# Patient Record
Sex: FEMALE | Race: BLACK OR AFRICAN AMERICAN | NOT HISPANIC OR LATINO | Employment: OTHER | URBAN - METROPOLITAN AREA
[De-identification: names, ages, dates, MRNs, and addresses within clinical notes are randomized per-mention and may not be internally consistent; named-entity substitution may affect disease eponyms.]

---

## 2022-09-09 ENCOUNTER — NURSE TRIAGE (OUTPATIENT)
Dept: NURSING | Facility: CLINIC | Age: 62
End: 2022-09-09

## 2022-09-09 NOTE — TELEPHONE ENCOUNTER
Bad tooth seen in ER and the started antibiotics and then got C-diff started another antibiotic.   Pain in the tooth. Called dentist and told to be seen  Back top left tooth.   Offered [facility]/Urgent Care hours, she declined as she wanted to know if [facility]. Advised to call her insurance. She declined and stated [facility]  Anastacia Navarro RN on 9/9/2022 at 11:41 AM      Reason for Disposition    SEVERE toothache pain    Additional Information    Negative: Pale cold skin and very weak (can't stand)    Negative: Similar pain previously and it was from 'heart attack'    Negative: Similar pain previously and it was from 'angina' and not relieved by nitroglycerin    Negative: Sounds like a life-threatening emergency to the triager    Negative: Chest pain    Negative: Toothache followed tooth injury    Negative: Patient sounds very sick or weak to the triager    Negative: Face is swollen    Negative: Fever    Protocols used: TOOTHACHE-A-OH

## 2023-03-14 ENCOUNTER — TRANSFERRED RECORDS (OUTPATIENT)
Dept: HEALTH INFORMATION MANAGEMENT | Facility: CLINIC | Age: 63
End: 2023-03-14

## 2023-03-22 ENCOUNTER — TRANSFERRED RECORDS (OUTPATIENT)
Dept: HEALTH INFORMATION MANAGEMENT | Facility: CLINIC | Age: 63
End: 2023-03-22

## 2023-03-30 ENCOUNTER — MEDICAL CORRESPONDENCE (OUTPATIENT)
Dept: HEALTH INFORMATION MANAGEMENT | Facility: CLINIC | Age: 63
End: 2023-03-30

## 2023-04-19 ENCOUNTER — TRANSCRIBE ORDERS (OUTPATIENT)
Dept: OTHER | Age: 63
End: 2023-04-19

## 2023-04-19 DIAGNOSIS — M79.669 PAIN IN UNSPECIFIED LOWER LEG: Primary | ICD-10-CM

## 2023-04-26 NOTE — TELEPHONE ENCOUNTER
DIAGNOSIS: B/L lower leg pain   APPOINTMENT DATE: 5.3.23   NOTES STATUS DETAILS   OFFICE NOTE from referring provider Internal 4.19.23 Pratik Ojeda   OFFICE NOTE from other specialist Internal [facility]:  Transferred record-3.22.23     MRI PACS [facility]:  L tib/fib; 3.14.23  R tib/fib; 3.14.23     XRAYS (IMAGES & REPORTS) PACS [facility]:  R tib/fib; 11.1.22  L tib/fib; 11.1.22     Action April 26, 2023 4:07 PM    Action Taken [place] [facility]:  Phone  [phone]    IMAGES IN PACS ALREADY

## 2023-05-03 ENCOUNTER — PRE VISIT (OUTPATIENT)
Dept: ORTHOPEDICS | Facility: CLINIC | Age: 63
End: 2023-05-03

## 2023-05-03 ENCOUNTER — OFFICE VISIT (OUTPATIENT)
Dept: ORTHOPEDICS | Facility: CLINIC | Age: 63
End: 2023-05-03
Payer: COMMERCIAL

## 2023-05-03 ENCOUNTER — ANCILLARY PROCEDURE (OUTPATIENT)
Dept: GENERAL RADIOLOGY | Facility: CLINIC | Age: 63
End: 2023-05-03
Attending: FAMILY MEDICINE
Payer: COMMERCIAL

## 2023-05-03 DIAGNOSIS — M79.669 PAIN IN UNSPECIFIED LOWER LEG: ICD-10-CM

## 2023-05-03 DIAGNOSIS — I73.9 CLAUDICATION OF BOTH LOWER EXTREMITIES (H): Primary | ICD-10-CM

## 2023-05-03 PROCEDURE — 72100 X-RAY EXAM L-S SPINE 2/3 VWS: CPT | Performed by: SURGERY

## 2023-05-03 PROCEDURE — 99203 OFFICE O/P NEW LOW 30 MIN: CPT | Performed by: FAMILY MEDICINE

## 2023-05-03 RX ORDER — SPIRONOLACTONE 25 MG/1
2 TABLET ORAL DAILY
COMMUNITY
Start: 2023-01-31

## 2023-05-03 RX ORDER — ATORVASTATIN CALCIUM 40 MG/1
1 TABLET, FILM COATED ORAL AT BEDTIME
COMMUNITY
Start: 2022-11-01

## 2023-05-03 RX ORDER — GABAPENTIN 300 MG/1
300 CAPSULE ORAL 3 TIMES DAILY
COMMUNITY

## 2023-05-03 RX ORDER — BUPROPION HYDROCHLORIDE 150 MG/1
150 TABLET ORAL
COMMUNITY
Start: 2022-12-11

## 2023-05-03 RX ORDER — TOPIRAMATE 50 MG/1
TABLET, FILM COATED ORAL
COMMUNITY
Start: 2023-02-08

## 2023-05-03 NOTE — PROGRESS NOTES
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  Her bilateral LE pain only occurs after exercise or running.  It does not occur at rest.  Describes tightness and pain in the shin area of both legs. She does lose the feeling in her feet and causes her to fall. Typically, takes 20-30 minutes for her to legs to return to normal after exercises. She enjoys using the treadmill for exercise. She is only able to walk 2.0 mph on the treadmill.     Patient saw a vascular specialist at the [facility] who reviewed her lower extremity circulation studies.  They did not think that her mild evidence of peripheral vascular disease would be consistent with her symptoms.  [facility] transferred records reviewed by me.    Patient was subsequently referred to the [facility] by [facility] specifically [facility] extremity compartment testing.  Their concern is that she may have chronic exertional compartment syndrome, and they did not have testing for it available at the [facility].    The patient was not certain that she wanted to proceed with the option of a fasciotomy surgically, if it was offered to her because of elevated compartment pressures, so she did physical therapy instead, exercises that were focused on claudication symptoms.  Physical therapy did not help,    Patient denies a history of low back discomfort recently.  She remembers having some episodes of low back discomfort 10 years ago.  She denies numbness and tingling in the bilateral lower extremities with long periods of sitting or standing..  She denies having radiating pain into the lower extremities, apart from the time that she tries to walk on a treadmill.    Patient has had a total hysterectomy in the past,  colonoscopy 2010.    Patient indicates that she has been , which used to involve running.  She was a  for over 2 decades, she retired in 2012.  She indicated that her shins and her feet would always ache with any attempted running, but she did not tell her work about it because she did not want it to affect her employment.        Imaging study results below reviewed by me:  [facility] 3/14/2023: MRI right tibia and fibula without contrast.  No suspicious osseous lesions noted.  No muscular edema or atrophy.  No abnormal fluid collection or fascial defect.  Tendons appear normal.    [facility] 3/14/2023 MRI left tibia and fibula without contrast.  No suspicious osseous lesions, no muscular edema or atrophy.  No fascial defect.  Tendons appear normal.    [facility] 1/9/2023 bilateral lower extremity arterial ultrasound; using B-mode, color flow and spectral Doppler assessment: Right leg arteries patent, no areas of elevated velocity or abnormal waveform to suggest stenosis; left leg arteries patent, no areas of elevated velocity or abnormal waveform to suggest stenosis.    [facility]: Exercise ABIs, consistent with mild peripheral vascular disease.  Right side: Preexercise RAKEL 1.2, decreasing to 0.91 postexercise  Left side: Preexercise RAKEL 1.18, decreasing to 0.66 post exercise      PMH:  Hypertension, diverticular disease, pancreatic insufficiency (on Creon) anxiety, depression, cerebrovascular accident, GERD, colon polyp (2010) total hysterectomy, obstructive sleep apnea, obesity, history of tobacco use.    Active problem list:  There is no problem list on file for this patient.      FH:  No family history on file.    SH:  Social History     Socioeconomic History     Marital status:      Spouse name: Not on file     Number of children: Not on file     Years of education: Not on file     Highest education level: Not on file   Occupational History      Not on file   Tobacco Use     Smoking status: Not on file     Smokeless tobacco: Not on file   Substance and Sexual Activity     Alcohol use: Not on file     Drug use: Not on file     Sexual activity: Not on file   Other Topics Concern     Not on file   Social History Narrative     Not on file     Social Determinants of Health     Financial Resource Strain: Not on file   Food Insecurity: Not on file   Transportation Needs: Not on file   Physical Activity: Not on file   Stress: Not on file   Social Connections: Not on file   Intimate Partner Violence: Not on file   Housing Stability: Not on file       MEDS:  See EMR, reviewed  ALL:  See EMR, reviewed    REVIEW OF SYSTEMS:  CONSTITUTIONAL:NEGATIVE for fever, chills, change in weight  INTEGUMENTARY/SKIN: NEGATIVE for worrisome rashes, moles or lesions  EYES: NEGATIVE for vision changes or irritation  ENT/MOUTH: NEGATIVE for ear, mouth and throat problems  RESP:NEGATIVE for significant cough or SOB  BREAST: NEGATIVE for masses, tenderness or discharge  CV: NEGATIVE for chest pain, palpitations or peripheral edema  GI: NEGATIVE for nausea, abdominal pain, heartburn, or change in bowel habits  :NEGATIVE for frequency, dysuria, or hematuria  :NEGATIVE for frequency, dysuria, or hematuria  NEURO: NEGATIVE for weakness, dizziness or paresthesias  ENDOCRINE: NEGATIVE for temperature intolerance, skin/hair changes  HEME/ALLERGY/IMMUNE: NEGATIVE for bleeding problems  PSYCHIATRIC: NEGATIVE for changes in mood or affect        Forward flexion of lumbar spine to touch shins.  Extension full, without limitation.      Straight leg raise in seated position with chin-to-chest negative bilaterally.    Lower extremity strength is 5/5 symmetrically bilaterally to flexion and extension at hips, knees, ankles, including toe strength and foot evertor strength.    VELVET test negative.  Normal ROM at hips bilaterally.  Nontender over bilateral SI joints.      Inguinal pulses and  posterior tibial pulses strong and symmetrical bilaterally.    1+ reflexes bilateral knees, 2+ reflexes bilateral ankles.  Downgoing toes.  No clonus.    Sensation to light touch intact bilateral lower extremities.    Skin overlying low back wnl.  Appropriate in conversation and affect        I personally reviewed with the patient x-rays of the lumbar spine that show overall good maintenance of the joint spaces with no evidence of significant degenerative disc disease.  Mild degenerative joint disease noted.      Assessment bilateral lower extremity claudication symptoms with walking and running    Plan: MRA with maneuvers pending of the bilateral lower extremities to rule out popliteal artery entrapment.  Follow-up face-to-face visit after the MRA studies.  We agreed that if it did not yield positive results that it would be reasonable, based on her clinical history, to proceed with bilateral lower extremity compartment testing to rule out chronic compartment syndrome.

## 2023-05-03 NOTE — LETTER
5/3/2023      RE: Sherrie Jackson  [address] [id number]  [place] [address]     Dear Colleague,    Thank you for referring your patient, Sherrie Jackson, to the [facility] [place]. Please see a copy of my visit note below.    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  Her bilateral LE pain only occurs after exercise or running.  It does not occur at rest.  Describes tightness and pain in the shin area of both legs. She does lose the feeling in her feet and causes her to fall. Typically, takes 20-30 minutes for her to legs to return to normal after exercises. She enjoys using the treadmill for exercise. She is only able to walk 2.0 mph on the treadmill.     Patient saw a vascular specialist at the [facility] who reviewed her lower extremity circulation studies.  They did not think that her mild evidence of peripheral vascular disease would be consistent with her symptoms.  [facility] transferred records reviewed by me.    Patient was subsequently [facility] to the [facility] extremity compartment testing.  Their concern is that she may have chronic exertional compartment syndrome, and they did not have testing for it available at the [facility].    The patient was not certain that she wanted to proceed with the option of a fasciotomy surgically, if it was offered to her because of elevated compartment pressures, so she did physical therapy instead, exercises that were focused on claudication symptoms.  Physical therapy did not help,    Patient denies a history of low back discomfort recently.  She remembers having some episodes of low back discomfort 10 years ago.  She denies numbness and tingling in  the bilateral lower extremities with long periods of sitting or standing..  She denies having radiating pain into the lower extremities, apart from the time that she tries to walk on a treadmill.    Patient has had a total hysterectomy in the past, colonoscopy 2010.    Patient indicates that she has been , which used to involve running.  She was a  for over 2 decades, she retired in 2012.  She indicated that her shins and her feet would always ache with any attempted running, but she did not tell her work about it because she did not want it to affect her employment.        Imaging study results below reviewed by me:  [facility] 3/14/2023: MRI right tibia and fibula without contrast.  No suspicious osseous lesions noted.  No muscular edema or atrophy.  No abnormal fluid collection or fascial defect.  Tendons appear normal.    [facility] 3/14/2023 MRI left tibia and fibula without contrast.  No suspicious osseous lesions, no muscular edema or atrophy.  No fascial defect.  Tendons appear normal.    [facility] 1/9/2023 bilateral lower extremity arterial ultrasound; using B-mode, color flow and spectral Doppler assessment: Right leg arteries patent, no areas of elevated velocity or abnormal waveform to suggest stenosis; left leg arteries patent, no areas of elevated velocity or abnormal waveform to suggest stenosis.    [facility]: Exercise ABIs, consistent with mild peripheral vascular disease.  Right side: Preexercise RAKEL 1.2, decreasing to 0.91 postexercise  Left side: Preexercise RAKEL 1.18, decreasing to 0.66 post exercise      PMH:  Hypertension, diverticular disease, pancreatic insufficiency (on Creon) anxiety, depression, cerebrovascular accident, GERD, colon polyp (2010) total hysterectomy, obstructive sleep apnea, obesity, history of tobacco use.    Active problem list:  There is no problem list on file for this patient.      FH:  No family history on  file.    SH:  Social History     Socioeconomic History    Marital status:      Spouse name: Not on file    Number of children: Not on file    Years of education: Not on file    Highest education level: Not on file   Occupational History    Not on file   Tobacco Use    Smoking status: Not on file    Smokeless tobacco: Not on file   Substance and Sexual Activity    Alcohol use: Not on file    Drug use: Not on file    Sexual activity: Not on file   Other Topics Concern    Not on file   Social History Narrative    Not on file     Social Determinants of Health     Financial Resource Strain: Not on file   Food Insecurity: Not on file   Transportation Needs: Not on file   Physical Activity: Not on file   Stress: Not on file   Social Connections: Not on file   Intimate Partner Violence: Not on file   Housing Stability: Not on file       MEDS:  See EMR, reviewed  ALL:  See EMR, reviewed    REVIEW OF SYSTEMS:  CONSTITUTIONAL:NEGATIVE for fever, chills, change in weight  INTEGUMENTARY/SKIN: NEGATIVE for worrisome rashes, moles or lesions  EYES: NEGATIVE for vision changes or irritation  ENT/MOUTH: NEGATIVE for ear, mouth and throat problems  RESP:NEGATIVE for significant cough or SOB  BREAST: NEGATIVE for masses, tenderness or discharge  CV: NEGATIVE for chest pain, palpitations or peripheral edema  GI: NEGATIVE for nausea, abdominal pain, heartburn, or change in bowel habits  :NEGATIVE for frequency, dysuria, or hematuria  :NEGATIVE for frequency, dysuria, or hematuria  NEURO: NEGATIVE for weakness, dizziness or paresthesias  ENDOCRINE: NEGATIVE for temperature intolerance, skin/hair changes  HEME/ALLERGY/IMMUNE: NEGATIVE for bleeding problems  PSYCHIATRIC: NEGATIVE for changes in mood or affect        Forward flexion of lumbar spine to touch shins.  Extension full, without limitation.      Straight leg raise in seated position with chin-to-chest negative bilaterally.    Lower extremity strength is 5/5  symmetrically bilaterally to flexion and extension at hips, knees, ankles, including toe strength and foot evertor strength.    VELVET test negative.  Normal ROM at hips bilaterally.  Nontender over bilateral SI joints.      Inguinal pulses and posterior tibial pulses strong and symmetrical bilaterally.    1+ reflexes bilateral knees, 2+ reflexes bilateral ankles.  Downgoing toes.  No clonus.    Sensation to light touch intact bilateral lower extremities.    Skin overlying low back wnl.  Appropriate in conversation and affect        I personally reviewed with the patient x-rays of the lumbar spine that show overall good maintenance of the joint spaces with no evidence of significant degenerative disc disease.  Mild degenerative joint disease noted.      Assessment bilateral lower extremity claudication symptoms with walking and running    Plan: MRA with maneuvers pending of the bilateral lower extremities to rule out popliteal artery entrapment.  Follow-up face-to-face visit after the MRA studies.  We agreed that if it did not yield positive results that it would be reasonable, based on her clinical history, to proceed with bilateral lower extremity compartment testing to rule out chronic compartment syndrome.                      Again, thank you for allowing me to participate in the care of your patient.      Sincerely,    Baudilio Alberts MD

## 2023-05-30 ENCOUNTER — HOSPITAL ENCOUNTER (OUTPATIENT)
Dept: MRI IMAGING | Facility: CLINIC | Age: 63
Discharge: HOME | End: 2023-05-30
Attending: FAMILY MEDICINE
Payer: COMMERCIAL

## 2023-05-30 ENCOUNTER — HOSPITAL ENCOUNTER (OUTPATIENT)
Dept: MRI IMAGING | Facility: CLINIC | Age: 63
Discharge: HOME | End: 2023-05-30
Attending: FAMILY MEDICINE | Admitting: FAMILY MEDICINE
Payer: COMMERCIAL

## 2023-05-30 DIAGNOSIS — I73.9 CLAUDICATION OF BOTH LOWER EXTREMITIES (H): ICD-10-CM

## 2023-05-30 PROCEDURE — 73725 MR ANG LWR EXT W OR W/O DYE: CPT | Mod: 26 | Performed by: RADIOLOGY

## 2023-05-30 PROCEDURE — 255N000002 HC RX 255 OP 636: Performed by: FAMILY MEDICINE

## 2023-05-30 PROCEDURE — 73725 MR ANG LWR EXT W OR W/O DYE: CPT | Mod: 50

## 2023-05-30 PROCEDURE — A9577 INJ MULTIHANCE: HCPCS | Performed by: FAMILY MEDICINE

## 2023-05-30 PROCEDURE — 999N000127 HC STATISTIC PERIPHERAL IV START W US GUIDANCE

## 2023-05-30 RX ADMIN — GADOBENATE DIMEGLUMINE 15 ML: 529 INJECTION, SOLUTION INTRAVENOUS at 16:16

## 2023-06-05 ENCOUNTER — OFFICE VISIT (OUTPATIENT)
Dept: ORTHOPEDICS | Facility: CLINIC | Age: 63
End: 2023-06-05
Payer: COMMERCIAL

## 2023-06-05 DIAGNOSIS — M79.605 BILATERAL LOWER EXTREMITY PAIN: Primary | ICD-10-CM

## 2023-06-05 DIAGNOSIS — M79.604 BILATERAL LOWER EXTREMITY PAIN: Primary | ICD-10-CM

## 2023-06-05 PROCEDURE — 99213 OFFICE O/P EST LOW 20 MIN: CPT | Performed by: FAMILY MEDICINE

## 2023-06-05 NOTE — PROGRESS NOTES
F/up MRA lower extremities. h/o bilateral lower extremity claudication symptoms with walking and running, chronic    I discussed with the patient tiabiy that the MRAs of the bilateral lower extremities, with maneuvers, showed no evidence of popliteal artery entrapment.      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  Her bilateral LE pain only occurs after exercise or running.  It does not occur at rest.  Describes tightness and pain in the shin area of both legs. She does lose the feeling in her feet and causes her to fall. Typically, takes 20-30 minutes for her to legs to return to normal after exercises. She enjoys using the treadmill for exercise. She is only able to walk 2.0 mph on the treadmill.      Patient saw a vascular specialist at the [facility] who reviewed her lower extremity circulation studies.  They did not think that her mild evidence of peripheral vascular disease would be consistent with her symptoms.  [facility] transferred records reviewed by me.     Patient was subsequently referred to the [facility] extremity compartment testing.  Their concern is that she may have chronic exertional compartment syndrome, and they did not have testing for it available [facility] the [facility].     The patient was initially not certain that she wanted to proceed with the option of a fasciotomy surgically, if it was offered to her because of elevated compartment pressures, so she did physical therapy instead, exercises that were focused on claudication symptoms.  Physical therapy did not help.     Patient denies a history of low back discomfort recently.  She remembers having some episodes of low back discomfort 10 years ago.  She denies numbness and  tingling in the bilateral lower extremities with long periods of sitting or standing..  She denies having radiating pain into the lower extremities, apart from the time that she tries to walk on a treadmill.    I personally reviewed with the patient x-rays of the lumbar spine that show overall good maintenance of the joint spaces with no evidence of significant degenerative disc disease.  Mild degenerative joint disease noted.     Patient has had a total hysterectomy in the past, colonoscopy 2010.     Patient indicates that she has been , which used to involve running.  She was a  for over 2 decades, she retired in 2012.  She indicated that her shins and her feet would always ache with any attempted running, but she did not tell her work about it because she did not want it to affect her employment.           Imaging study results below reviewed by me:  [facility] 3/14/2023: MRI right tibia and fibula without contrast.  No suspicious osseous lesions noted.  No muscular edema or atrophy.  No abnormal fluid collection or fascial defect.  Tendons appear normal.     [facility] 3/14/2023 MRI left tibia and fibula without contrast.  No suspicious osseous lesions, no muscular edema or atrophy.  No fascial defect.  Tendons appear normal.     [facility] 1/9/2023 bilateral lower extremity arterial ultrasound; using B-mode, color flow and spectral Doppler assessment: Right leg arteries patent, no areas of elevated velocity or abnormal waveform to suggest stenosis; left leg arteries patent, no areas of elevated velocity or abnormal waveform to suggest stenosis.     [facility]: Exercise ABIs, consistent with mild peripheral vascular disease.  Right side: Preexercise RAKEL 1.2, decreasing to 0.91 postexercise  Left side: Preexercise RAKEL 1.18, decreasing to 0.66 post exercise          PMH:  Hypertension, diverticular disease, pancreatic insufficiency (on Creon) anxiety,  depression, cerebrovascular accident, GERD, colon polyp (2010) total hysterectomy, obstructive sleep apnea, obesity, history of tobacco use.    Active problem list:  There is no problem list on file for this patient.      FH:  No family history on file.    SH:  Social History     Socioeconomic History     Marital status:      Spouse name: Not on file     Number of children: Not on file     Years of education: Not on file     Highest education level: Not on file   Occupational History     Not on file   Tobacco Use     Smoking status: Not on file     Smokeless tobacco: Not on file   Substance and Sexual Activity     Alcohol use: Not on file     Drug use: Not on file     Sexual activity: Not on file   Other Topics Concern     Not on file   Social History Narrative     Not on file     Social Determinants of Health     Financial Resource Strain: Not on file   Food Insecurity: Not on file   Transportation Needs: Not on file   Physical Activity: Not on file   Stress: Not on file   Social Connections: Not on file   Intimate Partner Violence: Not on file   Housing Stability: Not on file       MEDS:  See EMR, reviewed  ALL:  See EMR, reviewed    REVIEW OF SYSTEMS:  CONSTITUTIONAL:NEGATIVE for fever, chills, change in weight  INTEGUMENTARY/SKIN: NEGATIVE for worrisome rashes, moles or lesions  EYES: NEGATIVE for vision changes or irritation  ENT/MOUTH: NEGATIVE for ear, mouth and throat problems  RESP:NEGATIVE for significant cough or SOB  BREAST: NEGATIVE for masses, tenderness or discharge  CV: NEGATIVE for chest pain, palpitations or peripheral edema  GI: NEGATIVE for nausea, abdominal pain, heartburn, or change in bowel habits  :NEGATIVE for frequency, dysuria, or hematuria  :NEGATIVE for frequency, dysuria, or hematuria  NEURO: NEGATIVE for weakness, dizziness or paresthesias  ENDOCRINE: NEGATIVE for temperature intolerance, skin/hair changes  HEME/ALLERGY/IMMUNE: NEGATIVE for bleeding problems  PSYCHIATRIC:  NEGATIVE for changes in mood or affect          Lower extremity strength is 5/5 symmetrically bilaterally to flexion and extension at hips, knees, ankles, including toe strength and foot evertor strength.     VELVET test negative.  Normal ROM at hips bilaterally.  Nontender over bilateral SI joints.       Sensation to light touch intact bilateral lower extremities.     Appropriate in conversation and affect       Assessment bilateral lower extremity claudication symptoms with walking and running, chronic and recurrent     Plan: We agreed that it would be reasonable, based on her clinical history, to proceed with bilateral lower extremity compartment testing to rule out chronic exertional compartment syndrome.  Pt is willing to travel to [place] to first consult with Dr. Polanco or Dr. Hull regarding compartment testing, and then proceed to compartment testing if deemed appropriate.

## 2023-06-05 NOTE — LETTER
6/5/2023      RE: Sherrie Jackson  [address] [address]  [place] [address]     Dear Colleague,    Thank you for referring your patient, Sherrie Jackson, to the [facility]IC[facility]IN[facility] [place]. Please see a copy of my visit note below.    F/up MRA lower extremities. h/o bilateral lower extremity claudication symptoms with walking and running, chronic    I discussed with the patient tioday that the MRAs of the bilateral lower extremities, with maneuvers, showed no evidence of popliteal artery entrapment.      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  Her bilateral LE pain only occurs after exercise or running.  It does not occur at rest.  Describes tightness and pain in the shin area of both legs. She does lose the feeling in her feet and causes her to fall. Typically, takes 20-30 minutes for her to legs to return to normal after exercises. She enjoys using the treadmill for exercise. She is only able to walk 2.0 mph on the treadmill.      Patient saw a vascular specialist at the [facility] who reviewed her lower extremity circulation studies.  They did not think that her mild evidence of peripheral vascular disease would be consistent with her symptoms.  [facility] transferred records reviewed by me.     [facility].  Their concern is that she may have chronic exertional compartment syndrome, and they did not have testing for it [facility] the [facility].     The patient was initially not certain that she wanted to proceed with the option of a fasciotomy surgically, if it was offered to her because of elevated compartment pressures, so she did physical therapy  instead, exercises that were focused on claudication symptoms.  Physical therapy did not help.     Patient denies a history of low back discomfort recently.  She remembers having some episodes of low back discomfort 10 years ago.  She denies numbness and tingling in the bilateral lower extremities with long periods of sitting or standing..  She denies having radiating pain into the lower extremities, apart from the time that she tries to walk on a treadmill.    I personally reviewed with the patient x-rays of the lumbar spine that show overall good maintenance of the joint spaces with no evidence of significant degenerative disc disease.  Mild degenerative joint disease noted.     Patient has had a total hysterectomy in the past, colonoscopy 2010.     Patient indicates that she has been , which used to involve running.  She was a  for over 2 decades, she retired in 2012.  She indicated that her shins and her feet would always ache with any attempted running, but she did not tell her work about it because she did not want it to affect her employment.           Imaging study results below reviewed by me:  [facility] 3/14/2023: MRI right tibia and fibula without contrast.  No suspicious osseous lesions noted.  No muscular edema or atrophy.  No abnormal fluid collection or fascial defect.  Tendons appear normal.     [facility] 3/14/2023 MRI left tibia and fibula without contrast.  No suspicious osseous lesions, no muscular edema or atrophy.  No fascial defect.  Tendons appear normal.     [facility] 1/9/2023 bilateral lower extremity arterial ultrasound; using B-mode, color flow and spectral Doppler assessment: Right leg arteries patent, no areas of elevated velocity or abnormal waveform to suggest stenosis; left leg arteries patent, no areas of elevated velocity or abnormal waveform to suggest stenosis.     [facility]: Exercise ABIs, consistent with mild peripheral  vascular disease.  Right side: Preexercise RAKEL 1.2, decreasing to 0.91 postexercise  Left side: Preexercise RAKEL 1.18, decreasing to 0.66 post exercise          PMH:  Hypertension, diverticular disease, pancreatic insufficiency (on Creon) anxiety, depression, cerebrovascular accident, GERD, colon polyp (2010) total hysterectomy, obstructive sleep apnea, obesity, history of tobacco use.    Active problem list:  There is no problem list on file for this patient.      FH:  No family history on file.    SH:  Social History     Socioeconomic History     Marital status:      Spouse name: Not on file     Number of children: Not on file     Years of education: Not on file     Highest education level: Not on file   Occupational History     Not on file   Tobacco Use     Smoking status: Not on file     Smokeless tobacco: Not on file   Substance and Sexual Activity     Alcohol use: Not on file     Drug use: Not on file     Sexual activity: Not on file   Other Topics Concern     Not on file   Social History Narrative     Not on file     Social Determinants of Health     Financial Resource Strain: Not on file   Food Insecurity: Not on file   Transportation Needs: Not on file   Physical Activity: Not on file   Stress: Not on file   Social Connections: Not on file   Intimate Partner Violence: Not on file   Housing Stability: Not on file       MEDS:  See EMR, reviewed  ALL:  See EMR, reviewed    REVIEW OF SYSTEMS:  CONSTITUTIONAL:NEGATIVE for fever, chills, change in weight  INTEGUMENTARY/SKIN: NEGATIVE for worrisome rashes, moles or lesions  EYES: NEGATIVE for vision changes or irritation  ENT/MOUTH: NEGATIVE for ear, mouth and throat problems  RESP:NEGATIVE for significant cough or SOB  BREAST: NEGATIVE for masses, tenderness or discharge  CV: NEGATIVE for chest pain, palpitations or peripheral edema  GI: NEGATIVE for nausea, abdominal pain, heartburn, or change in bowel habits  :NEGATIVE for frequency, dysuria, or  hematuria  :NEGATIVE for frequency, dysuria, or hematuria  NEURO: NEGATIVE for weakness, dizziness or paresthesias  ENDOCRINE: NEGATIVE for temperature intolerance, skin/hair changes  HEME/ALLERGY/IMMUNE: NEGATIVE for bleeding problems  PSYCHIATRIC: NEGATIVE for changes in mood or affect          Lower extremity strength is 5/5 symmetrically bilaterally to flexion and extension at hips, knees, ankles, including toe strength and foot evertor strength.     VELVET test negative.  Normal ROM at hips bilaterally.  Nontender over bilateral SI joints.       Sensation to light touch intact bilateral lower extremities.     Appropriate in conversation and affect       Assessment bilateral lower extremity claudication symptoms with walking and running, chronic and recurrent     Plan: We agreed that it would be reasonable, based on her clinical history, to proceed with bilateral lower extremity compartment testing to rule out chronic exertional compartment syndrome.  Pt is willing to travel to [place] to first consult with Dr. Polanco or Dr. Hull regarding compartment testing, and then proceed to compartment testing if deemed appropriate.                        Again, thank you for allowing me to participate in the care of your patient.      Sincerely,    Baudilio Alberts MD

## 2023-06-20 NOTE — TELEPHONE ENCOUNTER
DIAGNOSIS: Bilateral lower extremity compartment testing consult per Dr. Alberts.   APPOINTMENT DATE: 6/27/23   NOTES STATUS DETAILS   OFFICE NOTE from referring provider Internal 6/5/23 OV Baudilio Alberts MD     OFFICE [facility] from other specialist Sc[facility] in Media tab  [facility]   2/22/23 ortho consult with Dr Pratik Ojeda  3/14/23 PT OV Nikki Gurbbs PT      MEDICATION LIST Internal    [facility]  Reports: scanned in epic    Images: PACS  MR Right and Left Tib Fib : 3/14/23  XR Right and Left Tib Fib: 11/1/22   [facility]  Report: [facility]    Images: PACS  MRA lower ext right and left: 5/30/23  XR lumbar Spine: 5/3/23

## 2023-06-27 ENCOUNTER — PRE VISIT (OUTPATIENT)
Dept: ORTHOPEDICS | Facility: CLINIC | Age: 63
End: 2023-06-27

## 2023-06-27 ENCOUNTER — OFFICE VISIT (OUTPATIENT)
Dept: ORTHOPEDICS | Facility: CLINIC | Age: 63
End: 2023-06-27
Payer: COMMERCIAL

## 2023-06-27 DIAGNOSIS — M79.604 BILATERAL LOWER EXTREMITY PAIN: Primary | ICD-10-CM

## 2023-06-27 DIAGNOSIS — M79.605 BILATERAL LOWER EXTREMITY PAIN: Primary | ICD-10-CM

## 2023-06-27 PROCEDURE — 99214 OFFICE O/P EST MOD 30 MIN: CPT | Performed by: FAMILY MEDICINE

## 2023-06-27 NOTE — PROGRESS NOTES
"CHIEF COMPLAINT:  Pain of the Left Lower Leg and Pain of the Right Lower Leg       HISTORY OF PRESENT ILLNESS  Ms. Jackson is a pleasant 63 year old year old female who presents to clinic today with bilateral lower leg pain.  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 She has been seen by Dr. Alberts for this, most recently on 6/5/23. 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  Her bilateral LE pain only occurs after exercise or running.  It does not occur at rest.  Describes tightness and pain in the shin area of both legs. She does lose the feeling in her feet and causes her to fall. Typically, takes 20-30 minutes for her to legs to return to normal after exercises. She enjoys using the treadmill for exercise. She is only able to walk 2.0 mph on the treadmill. She can feel foot drop bilaterally shortly after starting to walk >2.0 treadmill speed. Unable to utilize incline.    Patient saw a vascular specialist at the [facility] who reviewed her lower extremity circulation studies.  They did not think that her mild evidence of peripheral vascular disease would be consistent with her symptoms.  [facility] transferred records reviewed by me.     Patient was subsequently [facility] extremity compartment testing.  Their concern is that she may have chronic exertional compartment syndrome, and they did not have testing for it [facility].\"    Onset: gradual  Location: bilateral lower legs  Quality:  sharp  Duration: Chronic  Severity: 0/10 currently, severe with brisk walk or treadmill.  Timing:intermittent episodes with walking up hill and running  Modifying factors:  resting and non-use makes it better, movement and use makes it " worse  Associated signs & symptoms: pain  Previous similar pain: No  Treatments to date: MRI, Vascular consult, MRA of bilateral legs, ABIs, XR lumbar spine.    Additional history: as documented    Review of Systems:    Have you recently had a a fever, chills, weight loss? No    Do you have any vision problems? No    Do you have any chest pain or edema? No    Do you have any shortness of breath or wheezing?  No    Do you have stomach problems? No    Do you have any numbness or focal weakness? No    Do you have diabetes? No    Do you have problems with bleeding or clotting? No    Do you have an rashes or other skin lesions? No    MEDICAL HISTORY  There is no problem list on file for this patient.      Current Outpatient Medications   Medication Sig Dispense Refill     atorvastatin (LIPITOR) 40 MG tablet Take 1 tablet by mouth At Bedtime       calcium-vitamin D-vitamin K (VIACTIV) 500-500-40 MG-UNT-MCG CHEW Take 1 tablet by mouth 2 times daily       gabapentin (NEURONTIN) 300 MG capsule Take 300 mg by mouth 3 times daily       omeprazole (PRILOSEC) 20 MG DR capsule 40 mg       spironolactone (ALDACTONE) 25 MG tablet Take 2 tablets by mouth daily       topiramate (TOPAMAX) 50 MG tablet TAKE 3 TABLETS (150MG) BY MOUTH EVERY MORNING AND TAKE 2 TABLETS (100MG) BEFORE DINNER FOR WEIGHT LOSS       buPROPion (WELLBUTRIN XL) 150 MG 24 hr tablet 150 mg (Patient not taking: Reported on 6/5/2023)         No Known Allergies    No family history on file.    Additional medical/Social/Surgical histories reviewed in [facility] and updated as appropriate.       PHYSICAL EXAM  There were no vitals taken for this visit.    General  - normal appearance, in no obvious distress  Musculoskeletal - Bilateral knee and lower legs  - stance: normal gait without limp  - inspection: no swelling or effusion, normal bone and joint alignment, no obvious deformity, well developed calf musculature.  - palpation: no joint line tenderness, calves soft,  supple, not firm, non-tender today.  - ROM: 135 degrees flexion, 0 degrees extension, not painful, normal actively and passively compared to contralateral  - strength: 5/5 in flexion, 5/5 in extension, 5/5 ankle strength  Neuro  - no sensory or motor deficit, grossly normal coordination, normal muscle tone  Skin  - no ecchymosis, erythema, warmth, or induration, no obvious rash      IMAGING :   MRI TIBIA/FIBULA LEFT W/O CONTRAST 3/14/23        XR LUMBAR SPINE 2/3 VIEWS  5/3/2023 11:23 AM     History: AP/LAT; Pain in unspecified lower leg     Technique: Standing  AP and lateral  views of the lumbar spine were  obtained.     Comparison: No     Findings:  5 lumbar type vertebral bodies.     No acute osseous abnormality..  Mild multilevel spondylosis most prominent L5-S1.  No spondylolisthesis     Nonobstructive bowel gas pattern.                                                        Impression:  1.  Mild lumbar spondylosis.      GANESH MEI MD     MRA BILATERAL POPLITEAL ARTERIES 5/30/2023 4:18 PM     CLINICAL HISTORY: POPLITEAL ARTERY ENTRAPMENT PROTOCOL, with  maneuvers,  bilateral lower extremities; Claudication of both lower  extremities (H).      COMPARISONS: None available.     REFERRING PROVIDER: DEVANG MIRANDA     TECHNIQUE: Localizer sequences obtained. Coronal and axial T2 TruFISP  and T2 HASTE sequences obtained through the knees. Axial  2D TOF  sequences obtained in neutral, dorsiflexion, and plantar flexion.  Coronal and axial T2 TruFISP sequences obtained through the knees and  calves. Coronal TWIST sequences obtained through the knees and upper  calves. Coronal subtracted MIP produced. Coronal and axial TruFISP  sequences through the abdomen, pelvis, thighs, and calves were  obtained. Pre and post fl3d coronal sequences through the abdomen,  pelvis, thighs, and calves were obtained. Multiplanar and 3D  reconstructions were produced.      CONTRAST: 15 mL Multihance IV.     FINDINGS: MRA: No  popliteal arterial stenosis demonstrated with  maneuvers in 2DTOF sequences.     Patent aorta. Anterior portions of the celiac and superior mesenteric  arteries are poorly demonstrated. Renal and inferior mesenteric  arteries patent.     Bilateral common, internal, and external iliac arteries patent.     RIGHT: Common, profundus, and superficial femoral arteries patent.     Popliteal artery patent.     High anterior tibial artery origin at the level of the femoral  condyles. Patent three vessel run off to the ankle. Pedal arteries not  included in the study.     LEFT: Common, profundus, and superficial femoral arteries patent.     Popliteal artery patent.     High anterior tibial artery origin at the level of the femoral  condyles. Patent three vessel run off to the ankle. Pedal arteries not  included in the study.     MR: Left upper pole 28 mm renal cyst. Left mid kidney 8 mm cyst.                                                                      IMPRESSION: No popliteal entrapment demonstrated. High origins of  bilateral anterior tibial arteries.     MORENA BULLOCK MD        ASSESSMENT & PLAN  Ms. Jackson is a 63 year old year old female who presents to clinic today with chronic exercise induced bilateral lower leg pain, paresthesias and weakness of ankle and foot.    Extensive workup to date.  Vascular rule-out completed. No concerning findings to suggest lumbar origin, although no MRI lumbar completed.  MR lower legs unremarkable.    Diagnosis: Chronic lower leg pain    Concern at this time does exist for chronic exertional compartment syndrome, especially describing symptoms that began at a specific point in exercise, with associated pain, paresthesias and foot drop.  I am confident about her vascular work-up and fortunately she has already had MRI/MRI of lower legs.    We discussed diagnostic measures for chronic exertional compartment syndrome to include compartment testing bilaterally.  I have walked her  through this mildly invasive procedure in detail.  She would like to pursue this, even if she is unsure about fasciotomy.  She would like to at least confirm suspicion this has been present for 40 years.    I will discuss with my team, physical therapy, and Dr. Polanco to organize a time and date for procedure.  She understands this and will reach out after this to provide option for dates.    It was a pleasure seeing Sherrie today.    Rickie Hull DO, CAM  Primary Care Sports Medicine

## 2023-08-17 ENCOUNTER — TELEPHONE (OUTPATIENT)
Dept: ORTHOPEDICS | Facility: CLINIC | Age: 63
End: 2023-08-17

## 2023-08-17 NOTE — TELEPHONE ENCOUNTER
I spoke with patient she stated that both Dr. Hull and Dr. Polanco were going to do this procedure for her. We got her scheduled for the appointment on 9/26. Message sent to [facility] treadmill for testing.     LINNEA Leong

## 2023-08-17 NOTE — TELEPHONE ENCOUNTER
[facility] Call Center    Phone Message    May a detailed message be left on voicemail: no     Reason for Call: Please c/b ASAP- Patient was supposed to be contacted for compartment testing bilaterally. No one has reached out to her to set that up yet. She hasn't heard anything since her appt on 06/27    Action Taken: Message routed to:  [facility]    Travel Screening: Not Applicable

## 2023-09-26 ENCOUNTER — OFFICE VISIT (OUTPATIENT)
Dept: ORTHOPEDICS | Facility: CLINIC | Age: 63
End: 2023-09-26
Payer: COMMERCIAL

## 2023-09-26 DIAGNOSIS — M79.605 CHRONIC PAIN OF LOWER EXTREMITY, BILATERAL: Primary | ICD-10-CM

## 2023-09-26 DIAGNOSIS — G89.29 CHRONIC PAIN OF LOWER EXTREMITY, BILATERAL: Primary | ICD-10-CM

## 2023-09-26 DIAGNOSIS — M79.604 CHRONIC PAIN OF LOWER EXTREMITY, BILATERAL: Primary | ICD-10-CM

## 2023-09-26 DIAGNOSIS — M79.605 BILATERAL LOWER EXTREMITY PAIN: Primary | ICD-10-CM

## 2023-09-26 DIAGNOSIS — M79.604 BILATERAL LOWER EXTREMITY PAIN: Primary | ICD-10-CM

## 2023-09-26 PROCEDURE — 20950 MNTR INTRSTITIAL FLUID PRESS: CPT | Mod: LT | Performed by: FAMILY MEDICINE

## 2023-09-26 PROCEDURE — 99207 PR DROP WITH A PROCEDURE: CPT | Performed by: FAMILY MEDICINE

## 2023-09-26 PROCEDURE — 20950 MNTR INTRSTITIAL FLUID PRESS: CPT | Mod: RT | Performed by: FAMILY MEDICINE

## 2023-09-26 PROCEDURE — 99207 PR NO CHARGE LOS: CPT | Performed by: FAMILY MEDICINE

## 2023-09-26 PROCEDURE — 76942 ECHO GUIDE FOR BIOPSY: CPT | Performed by: FAMILY MEDICINE

## 2023-09-26 NOTE — LETTER
9/26/2023      RE: Sherrie Jackson  [address] [address]  [place] [address]     Dear Colleague,    Thank you for referring your patient, Sherrie Jackson, to the [facility]TS MEDICINE CLINIC [place]. Please see a copy of my visit note below.    Please see note from Dr. Hull regarding today's test.      Again, thank you for allowing me to participate in the care of your patient.      Sincerely,    Rickie Polanco, DO

## 2023-09-26 NOTE — PROGRESS NOTES
PROCEDURE ENCOUNTER    [facility]  Orthopedics  Rickie Hull, DO  2023     Name: Sherrie Jackson  MRN: [id number]  Age: 63 year old  : 1960    Brief Hx: Sherrie explains that this pain started back in  when she was in the service. 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 Even waking her alone at night to run to exhaustion. Her bilateral LE pain only occurs after exercise.  It does not occur at rest.  Describes tightness and pain in the shin area of both legs. She does lose the feeling in her feet and causes her to fall. Typically, takes 20-30 minutes for her to legs to return to normal after exercises. She enjoys using the treadmill for exercise. She is only able to walk 2.0 mph on the treadmill. She can feel foot drop bilaterally shortly after starting to walk >2.0 treadmill speed. Unable to utilize incline.     Referring provider: Dr. Baudilio Alberts MD  Diagnosis: Chronic bilateral lower leg pain    Sherrie is here for compartment pressure testing.    We discussed the procedure in detail including the risks, benefits, and implications of a positive test result.  Ultrasound was used to identify entry sites in the anterior, lateral, and posterior compartments.  Doppler was used to ensure needle path is clear of any large vasculature.  The entry sites were marked with a surgical pen.  Each of the three sites were prepped with alcohol.  Each entry site was anesthetized with 2cc of 1% lidocaine.  Sites were then prepped in a sterile fashion with chlorhexidine prep.  Each compartment was measured using a digital BitPass Compartment Pressure Monitor.  A sterile side ported needle was used to enter each compartment under sterile conditions.  Compartment pressures were read and recorded.  The patient was then exercised on a treadmill while supervised.   Incline and speed were tailored to the patient's comfort level.  Sherrie exercised until symptoms presented and then to the point of intolerance.  Sherrie was then escorted to the exam table and compartment pressures were again recorded in identical fashion.  Numbers are recorded as below.    Left Leg-  Pre-exercise:    Anterior- 36    Lateral- 25    Posterior (deep) - 13    Post-exercise:    Anterior- 57    Lateral- 34    Posterior (deep)- 16    Right Leg-  Pre-exercise:     Anterior- 16     Lateral- 18     Posterior (deep)- 11     Post-exercise:     Anterior- 34     Lateral- 26     Posterior (deep)- 12    Exercise Protocol:    30 seconds 1 speed 0 incline  30 seconds 2 speed 0 incline  60 seconds 2.5 speed 0 incline  60 seconds 3 speed 0 incline  45 seconds 3 speed 1 incline  75 seconds 3 speed 2 incline  60 seconds 3 speed 3 incline  75 seconds 3 speed 4 incline  165 seconds 3.2 speed 4 incline  120 seconds 3.2 speed 5 incline  180 seconds 3.5 speed 5 incline    Impression: increased compartment pressures in the anterior compartments noted post-exercise are indicative of a positive test. Lateral compartment on left lower extremity also reaches post-exercise threshold for a positive test. 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    Rickie Hull DO [facility]  Primary Care Sports Medicine  [facility]

## 2023-09-26 NOTE — LETTER
2023      RE: Sherrie Jackson  [address] [address]  [place] [address]     Dear Colleague,    Thank you for referring your patient, Sherrie Jackson, to the [facility] SPORTS MEDICINE CLINIC [place]. Please see a copy of my visit note below.    PROCEDURE ENCOUNTER    [facility]  Orthopedics  Rickie Hull, DO  2023     Name: Sherrie Jackson  MRN: [id number]  Age: 63 year old  : 1960    Brief Hx: Sherrie explains that this pain started back in  when she was in the service. 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 Even waking her alone at night to run to exhaustion. Her bilateral LE pain only occurs after exercise.  It does not occur at rest.  Describes tightness and pain in the shin area of both legs. She does lose the feeling in her feet and causes her to fall. Typically, takes 20-30 minutes for her to legs to return to normal after exercises. She enjoys using the treadmill for exercise. She is only able to walk 2.0 mph on the treadmill. She can feel foot drop bilaterally shortly after starting to walk >2.0 treadmill speed. Unable to utilize incline.     Referring provider: Dr. Baudilio Alberts MD  Diagnosis: Chronic bilateral lower leg pain    Sherrie is here for compartment pressure testing.    We discussed the procedure in detail including the risks, benefits, and implications of a positive test result.  Ultrasound was used to identify entry sites in the anterior, lateral, and posterior compartments.  Doppler was used to ensure needle path is clear of any large vasculature.  The entry sites were marked with a surgical pen.  Each of the three sites were prepped with alcohol.  Each entry site was anesthetized with 2cc of 1% lidocaine.  Sites were then prepped in a sterile fashion with chlorhexidine prep.  Each compartment was measured  using a digital ShoutOmatic Compartment Pressure Monitor.  A sterile side ported needle was used to enter each compartment under sterile conditions.  Compartment pressures were read and recorded.  The patient was then exercised on a treadmill while supervised.  Incline and speed were tailored to the patient's comfort level.  Sherrie exercised until symptoms presented and then to the point of intolerance.  Sherrie was then escorted to the exam table and compartment pressures were again recorded in identical fashion.  Numbers are recorded as below.    Left Leg-  Pre-exercise:    Anterior- 36    Lateral- 25    Posterior (deep) - 13    Post-exercise:    Anterior- 57    Lateral- 34    Posterior (deep)- 16    Right Leg-  Pre-exercise:     Anterior- 16     Lateral- 18     Posterior (deep)- 11     Post-exercise:     Anterior- 34     Lateral- 26     Posterior (deep)- 12    Exercise Protocol:    30 seconds 1 speed 0 incline  30 seconds 2 speed 0 incline  60 seconds 2.5 speed 0 incline  60 seconds 3 speed 0 incline  45 seconds 3 speed 1 incline  75 seconds 3 speed 2 incline  60 seconds 3 speed 3 incline  75 seconds 3 speed 4 incline  165 seconds 3.2 speed 4 incline  120 seconds 3.2 speed 5 incline  180 seconds 3.5 speed 5 incline    Impression: increased compartment pressures in the anterior compartments noted post-exercise are indicative of a positive test. Lateral compartment on left lower extremity also reaches post-exercise threshold for a positive test. 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    Rickie Hull DO [facility]  Primary Care Sports Medicine  [facility]         Again, thank you for allowing me to participate in the care of your patient.      Sincerely,    Rickie Hull DO

## 2023-09-28 ENCOUNTER — TELEPHONE (OUTPATIENT)
Dept: ORTHOPEDICS | Facility: CLINIC | Age: 63
End: 2023-09-28

## 2023-09-28 NOTE — TELEPHONE ENCOUNTER
[facility] Call Center     Phone Message     May a detailed message be left on voicemail: Yes     Reason for Call:   Pt called and said there is a problem and she would like to talk to her care team.

## 2023-09-28 NOTE — TELEPHONE ENCOUNTER
LVM about a phone visit to discuss her compartment testing results and next steps. Tentative appointment on 10/4/23.

## 2023-10-04 ENCOUNTER — VIRTUAL VISIT (OUTPATIENT)
Dept: ORTHOPEDICS | Facility: CLINIC | Age: 63
End: 2023-10-04
Payer: COMMERCIAL

## 2023-10-04 DIAGNOSIS — M79.A21 EXERTIONAL COMPARTMENT SYNDROME OF BOTH LOWER EXTREMITIES: ICD-10-CM

## 2023-10-04 DIAGNOSIS — M79.A22 EXERTIONAL COMPARTMENT SYNDROME OF BOTH LOWER EXTREMITIES: ICD-10-CM

## 2023-10-04 DIAGNOSIS — M79.605 BILATERAL LOWER EXTREMITY PAIN: Primary | ICD-10-CM

## 2023-10-04 DIAGNOSIS — M79.604 BILATERAL LOWER EXTREMITY PAIN: Primary | ICD-10-CM

## 2023-10-04 PROCEDURE — 99441 PR PHYSICIAN TELEPHONE EVALUATION 5-10 MIN: CPT | Mod: 95 | Performed by: FAMILY MEDICINE

## 2023-10-04 NOTE — LETTER
10/4/2023       RE: Shrerie Jackson  [address] [address]  [place] [address]     Dear Colleague,    Thank you for referring your patient, Sherrie Jackson, to the [facility] SPORTS MEDICINE CLINIC [place] at [facility]. Please see a copy of my visit note below.    Sherrie is a 63 year old who is being evaluated via a billable telephone visit.      What phone number would you like to be contacted at? [phone]    How would you like to obtain your AVS? MyChart    Distant Location (provider location):  On-site      Phone f/up compartment testing results        Phone start: 10:40  Phone stop: 10:44      S: I discussed with the patient the positive results indicating evidence of exertional compartment syndrome.  I offered the patient surgical consultation with Dr. Epstein to discuss surgical options.  Patient declined the surgical consult for now.  [facility]  She understands that if she tries this physical therapy and is not helpful that she can either MyChart or call back and I would be glad to arrange for her to have a consultation with Dr. Epstein in orthopedic surgery.  I agreed to put in a physical therapy referral, although the patient would [facility].  I suggested to her that she may have to have a [facility] the [facility] the [facility], from her primary provider, in order to get [facility] the [facility].  She states she understood this.      O:  GENERAL: healthy, alert, without distress  RESP: No audible wheeze, cough.  No increased work of breathing.    NEURO:  Mentation and speech appropriate for age.  PSYCH: mentation appears normal, concentration appears appropriate, judgement and insight intact.            Compartment testing 9/26/2023:  Impression: increased compartment pressures in the anterior compartments noted post-exercise are indicative of a positive test. Lateral  compartment on left lower extremity also reaches post-exercise threshold for a positive test. 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         MRAs of the bilateral lower extremities, with maneuvers, showed no evidence of popliteal artery entrapment.         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  Her bilateral LE pain only occurs after exercise or running.  It does not occur at rest.  Describes tightness and pain in the shin area of both legs. She does lose the feeling in her feet and causes her to fall. Typically, takes 20-30 minutes for her to legs to return to normal after exercises. She enjoys using the treadmill for exercise. She is only able to walk 2.0 mph on the treadmill.      Patient saw a vascular specialist at the [facility] who reviewed her lower extremity circulation studies.  They did not think that her mild evidence of peripheral vascular disease would be consistent with her symptoms.  [facility] transferred records reviewed by me.     Patient was subsequently [facility] extremity compartment testing.  Their concern is that she may have chronic exertional compartment syndrome, and they did not have testing for it available [facility] the [facility].     The patient was initially not certain that she wanted to proceed with the option of a fasciotomy surgically, if it was offered to her because of elevated compartment pressures, so she did physical therapy instead, exercises that were focused on claudication symptoms.  Physical therapy did not help.     Patient denies a history of low back discomfort recently.  She remembers having some episodes of low back discomfort 10  years ago.  She denies numbness and tingling in the bilateral lower extremities with long periods of sitting or standing..  She denies having radiating pain into the lower extremities, apart from the time that she tries to walk on a treadmill.     I personally reviewed with the patient x-rays of the lumbar spine that show overall good maintenance of the joint spaces with no evidence of significant degenerative disc disease.  Mild degenerative joint disease noted.     Patient has had a total hysterectomy in the past, colonoscopy 2010.     Patient indicates that she has been , which used to involve running.  She was a  for over 2 decades, she retired in 2012.  She indicated that her shins and her feet would always ache with any attempted running, but she did not tell her work about it because she did not want it to affect her employment.           Imaging study results below reviewed by me:  [facility] 3/14/2023: MRI right tibia and fibula without contrast.  No suspicious osseous lesions noted.  No muscular edema or atrophy.  No abnormal fluid collection or fascial defect.  Tendons appear normal.     [facility] 3/14/2023 MRI left tibia and fibula without contrast.  No suspicious osseous lesions, no muscular edema or atrophy.  No fascial defect.  Tendons appear normal.     [facility] 1/9/2023 bilateral lower extremity arterial ultrasound; using B-mode, color flow and spectral Doppler assessment: Right leg arteries patent, no areas of elevated velocity or abnormal waveform to suggest stenosis; left leg arteries patent, no areas of elevated velocity or abnormal waveform to suggest stenosis.     [facility]: Exercise ABIs, consistent with mild peripheral vascular disease.  Right side: Preexercise RAKEL 1.2, decreasing to 0.91 postexercise  Left side: Preexercise RAKEL 1.18, decreasing to 0.66 post exercise            PMH:  Hypertension, diverticular disease, pancreatic  insufficiency (on Creon) anxiety, depression, cerebrovascular accident, GERD, colon polyp (2010) total hysterectomy, obstructive sleep apnea, obesity, history of tobacco use.    Active problem list:  There is no problem list on file for this patient.      FH:  No family history on file.    SH:  Social History     Socioeconomic History    Marital status:      Spouse name: Not on file    Number of children: Not on file    Years of education: Not on file    Highest education level: Not on file   Occupational History    Not on file   Tobacco Use    Smoking status: Not on file    Smokeless tobacco: Not on file   Substance and Sexual Activity    Alcohol use: Not on file    Drug use: Not on file    Sexual activity: Not on file   Other Topics Concern    Not on file   Social History Narrative    Not on file     Social Determinants of Health     Financial Resource Strain: Not on file   Food Insecurity: Not on file   Transportation Needs: Not on file   Physical Activity: Not on file   Stress: Not on file   Social Connections: Not on file   Interpersonal Safety: Not on file   Housing Stability: Not on file       MEDS:  See EMR, reviewed  ALL:  See EMR, reviewed    REVIEW OF SYSTEMS:  CONSTITUTIONAL:NEGATIVE for fever, chills, change in weight  INTEGUMENTARY/SKIN: NEGATIVE for worrisome rashes, moles or lesions  EYES: NEGATIVE for vision changes or irritation  ENT/MOUTH: NEGATIVE for ear, mouth and throat problems  RESP:NEGATIVE for significant cough or SOB  BREAST: NEGATIVE for masses, tenderness or discharge  CV: NEGATIVE for chest pain, palpitations or peripheral edema  GI: NEGATIVE for nausea, abdominal pain, heartburn, or change in bowel habits  :NEGATIVE for frequency, dysuria, or hematuria  :NEGATIVE for frequency, dysuria, or hematuria  NEURO: NEGATIVE for weakness, dizziness or paresthesias  ENDOCRINE: NEGATIVE for temperature intolerance, skin/hair changes  HEME/ALLERGY/IMMUNE: NEGATIVE for bleeding  problems  PSYCHIATRIC: NEGATIVE for changes in mood or affect        Baudilio Alberts MD

## 2023-10-04 NOTE — PROGRESS NOTES
Sherrie is a 63 year old who is being evaluated via a billable telephone visit.      What phone number would you like to be contacted at? [phone]    How would you like to obtain your AVS? Tyrel    Distant Location (provider location):  On-site      Phone f/up compartment testing results        Phone start: 10:40  Phone stop: 10:44      S: I discussed with the patient the positive results indicating evidence of exertional compartment syndrome.  I offered the patient surgical consultation with Dr. Epstein to discuss surgical options.  Patient declined the surgical consult for now.  She indicates that [facility] would [facility]  She understands that if she tries this physical therapy and is not helpful that she can either MyChart or call back and I would be glad to arrange for her to have a consultation with Dr. Epstein in orthopedic surgery.  I agreed to put in a physical therapy referral, although the patient would like [facility].  I suggested to her that she may have to have a consult within the [facility] of the [facility], from her primary provider, in order to get physical [facility] the [facility].  She states she understood this.      O:  GENERAL: healthy, alert, without distress  RESP: No audible wheeze, cough.  No increased work of breathing.    NEURO:  Mentation and speech appropriate for age.  PSYCH: mentation appears normal, concentration appears appropriate, judgement and insight intact.            Compartment testing 9/26/2023:  Impression: increased compartment pressures in the anterior compartments noted post-exercise are indicative of a positive test. Lateral compartment on left lower extremity also reaches post-exercise threshold for a positive test. 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         MRAs of the bilateral lower extremities, with maneuvers, showed no evidence of popliteal artery entrapment.         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  Her bilateral LE pain only occurs after exercise or running.  It does not occur at rest.  Describes tightness and pain in the shin area of both legs. She does lose the feeling in her feet and causes her to fall. Typically, takes 20-30 minutes for her to legs to return to normal after exercises. She enjoys using the treadmill for exercise. She is only able to walk 2.0 mph on the treadmill.      Patient saw a vascular specialist at the [facility] who reviewed her lower extremity circulation studies.  They did not think that her mild evidence of peripheral vascular disease would be consistent with her symptoms.  [facility] transferred records reviewed by me.     Patient was subsequently [facility] the [facility] extremity compartment testing.  Their concern is that she may have chronic exertional compartment syndrome, and they did not have testing for it available [facility] the [facility].     The patient was initially not certain that she wanted to proceed with the option of a fasciotomy surgically, if it was offered to her because of elevated compartment pressures, so she did physical therapy instead, exercises that were focused on claudication symptoms.  Physical therapy did not help.     Patient denies a history of low back discomfort recently.  She remembers having some episodes of low back discomfort 10 years ago.  She denies numbness and tingling in the bilateral lower extremities with long periods of sitting or standing..  She denies having radiating pain into the lower extremities, apart from the time that she tries to walk on a treadmill.     I personally reviewed with the patient x-rays of the lumbar spine that  show overall good maintenance of the joint spaces with no evidence of significant degenerative disc disease.  Mild degenerative joint disease noted.     Patient has had a total hysterectomy in the past, colonoscopy 2010.     Patient indicates that she has been , which used to involve running.  She was a  for over 2 decades, she retired in 2012.  She indicated that her shins and her feet would always ache with any attempted running, but she did not tell her work about it because she did not want it to affect her employment.           Imaging study results below reviewed by me:  [facility] 3/14/2023: MRI right tibia and fibula without contrast.  No suspicious osseous lesions noted.  No muscular edema or atrophy.  No abnormal fluid collection or fascial defect.  Tendons appear normal.     [facility] 3/14/2023 MRI left tibia and fibula without contrast.  No suspicious osseous lesions, no muscular edema or atrophy.  No fascial defect.  Tendons appear normal.     [facility] 1/9/2023 bilateral lower extremity arterial ultrasound; using B-mode, color flow and spectral Doppler assessment: Right leg arteries patent, no areas of elevated velocity or abnormal waveform to suggest stenosis; left leg arteries patent, no areas of elevated velocity or abnormal waveform to suggest stenosis.     [facility]: Exercise ABIs, consistent with mild peripheral vascular disease.  Right side: Preexercise RAKEL 1.2, decreasing to 0.91 postexercise  Left side: Preexercise RAKEL 1.18, decreasing to 0.66 post exercise            PMH:  Hypertension, diverticular disease, pancreatic insufficiency (on Creon) anxiety, depression, cerebrovascular accident, GERD, colon polyp (2010) total hysterectomy, obstructive sleep apnea, obesity, history of tobacco use.    Active problem list:  There is no problem list on file for this patient.      FH:  No family history on file.    SH:  Social History      Socioeconomic History    Marital status:      Spouse name: Not on file    Number of children: Not on file    Years of education: Not on file    Highest education level: Not on file   Occupational History    Not on file   Tobacco Use    Smoking status: Not on file    Smokeless tobacco: Not on file   Substance and Sexual Activity    Alcohol use: Not on file    Drug use: Not on file    Sexual activity: Not on file   Other Topics Concern    Not on file   Social History Narrative    Not on file     Social Determinants of Health     Financial Resource Strain: Not on file   Food Insecurity: Not on file   Transportation Needs: Not on file   Physical Activity: Not on file   Stress: Not on file   Social Connections: Not on file   Interpersonal Safety: Not on file   Housing Stability: Not on file       MEDS:  See EMR, reviewed  ALL:  See EMR, reviewed    REVIEW OF SYSTEMS:  CONSTITUTIONAL:NEGATIVE for fever, chills, change in weight  INTEGUMENTARY/SKIN: NEGATIVE for worrisome rashes, moles or lesions  EYES: NEGATIVE for vision changes or irritation  ENT/MOUTH: NEGATIVE for ear, mouth and throat problems  RESP:NEGATIVE for significant cough or SOB  BREAST: NEGATIVE for masses, tenderness or discharge  CV: NEGATIVE for chest pain, palpitations or peripheral edema  GI: NEGATIVE for nausea, abdominal pain, heartburn, or change in bowel habits  :NEGATIVE for frequency, dysuria, or hematuria  :NEGATIVE for frequency, dysuria, or hematuria  NEURO: NEGATIVE for weakness, dizziness or paresthesias  ENDOCRINE: NEGATIVE for temperature intolerance, skin/hair changes  HEME/ALLERGY/IMMUNE: NEGATIVE for bleeding problems  PSYCHIATRIC: NEGATIVE for changes in mood or affect

## 2025-04-25 NOTE — LETTER
"  6/27/2023      RE: Sherrie Jackson  [address] [id number]  [place] [address]     Dear Colleague,    Thank you for referring your patient, Sherrie Jackson, to the [facility] [place]. Please see a copy of my visit note below.    CHIEF COMPLAINT:  Pain of the Left Lower Leg and Pain of the Right Lower Leg       HISTORY OF PRESENT ILLNESS  Ms. Jackson is a pleasant 63 year old year old female who presents to clinic today with bilateral lower leg pain.  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 She has been seen by Dr. Alberts for this, most recently on 6/5/23. 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  Her bilateral LE pain only occurs after exercise or running.  It does not occur at rest.  Describes tightness and pain in the shin area of both legs. She does lose the feeling in her feet and causes her to fall. Typically, takes 20-30 minutes for her to legs to return to normal after exercises. She enjoys using the treadmill for exercise. She is only able to walk 2.0 mph on the treadmill. She can feel foot drop bilaterally shortly after starting to walk >2.0 treadmill speed. Unable to utilize incline.    Patient saw a vascular specialist at the [facility] who reviewed her lower extremity circulation studies.  They did not think that her mild evidence of peripheral vascular disease would be consistent with her symptoms.  [facility] transferred records reviewed by me.     [facility] compartment testing.  Their concern is that she may have chronic exertional compartment syndrome, and they did not have testing for it available [facility].\"    Onset: gradual  Location: bilateral lower " Patient reports left sided abdominal discomfort that started yesterday and is worse today, irregular bowel movements, patient recently diagnosed with and treated for diverticulitis approx 2.5 weeks ago, finished antibiotics and felt better for about 9 days before pain began again yesterday, denies nausea/vomiting   legs  Quality:  sharp  Duration: Chronic  Severity: 0/10 currently, severe with brisk walk or treadmill.  Timing:intermittent episodes with walking up hill and running  Modifying factors:  resting and non-use makes it better, movement and use makes it worse  Associated signs & symptoms: pain  Previous similar pain: No  Treatments to date: MRI, Vascular consult, MRA of bilateral legs, ABIs, XR lumbar spine.    Additional history: as documented    Review of Systems:    Have you recently had a a fever, chills, weight loss? No    Do you have any vision problems? No    Do you have any chest pain or edema? No    Do you have any shortness of breath or wheezing?  No    Do you have stomach problems? No    Do you have any numbness or focal weakness? No    Do you have diabetes? No    Do you have problems with bleeding or clotting? No    Do you have an rashes or other skin lesions? No    MEDICAL HISTORY  There is no problem list on file for this patient.      Current Outpatient Medications   Medication Sig Dispense Refill     atorvastatin (LIPITOR) 40 MG tablet Take 1 tablet by mouth At Bedtime       calcium-vitamin D-vitamin K (VIACTIV) 500-500-40 MG-UNT-MCG CHEW Take 1 tablet by mouth 2 times daily       gabapentin (NEURONTIN) 300 MG capsule Take 300 mg by mouth 3 times daily       omeprazole (PRILOSEC) 20 MG DR capsule 40 mg       spironolactone (ALDACTONE) 25 MG tablet Take 2 tablets by mouth daily       topiramate (TOPAMAX) 50 MG tablet TAKE 3 TABLETS (150MG) BY MOUTH EVERY MORNING AND TAKE 2 TABLETS (100MG) BEFORE DINNER FOR WEIGHT LOSS       buPROPion (WELLBUTRIN XL) 150 MG 24 hr tablet 150 mg (Patient not taking: Reported on 6/5/2023)         No Known Allergies    No family history on file.    Additional medical/Social/Surgical histories reviewed in [facility] and updated as appropriate.       PHYSICAL EXAM  There were no vitals taken for this visit.    General  - normal appearance, in no obvious distress  Musculoskeletal -  Bilateral knee and lower legs  - stance: normal gait without limp  - inspection: no swelling or effusion, normal bone and joint alignment, no obvious deformity, well developed calf musculature.  - palpation: no joint line tenderness, calves soft, supple, not firm, non-tender today.  - ROM: 135 degrees flexion, 0 degrees extension, not painful, normal actively and passively compared to contralateral  - strength: 5/5 in flexion, 5/5 in extension, 5/5 ankle strength  Neuro  - no sensory or motor deficit, grossly normal coordination, normal muscle tone  Skin  - no ecchymosis, erythema, warmth, or induration, no obvious rash      IMAGING :   MRI TIBIA/FIBULA LEFT W/O CONTRAST 3/14/23        XR LUMBAR SPINE 2/3 VIEWS  5/3/2023 11:23 AM     History: AP/LAT; Pain in unspecified lower leg     Technique: Standing  AP and lateral  views of the lumbar spine were  obtained.     Comparison: No     Findings:  5 lumbar type vertebral bodies.     No acute osseous abnormality..  Mild multilevel spondylosis most prominent L5-S1.  No spondylolisthesis     Nonobstructive bowel gas pattern.                                                        Impression:  1.  Mild lumbar spondylosis.      GANESH MEI MD     MRA BILATERAL POPLITEAL ARTERIES 5/30/2023 4:18 PM     CLINICAL HISTORY: POPLITEAL ARTERY ENTRAPMENT PROTOCOL, with  maneuvers,  bilateral lower extremities; Claudication of both lower  extremities (H).      COMPARISONS: None available.     REFERRING PROVIDER: DEVANG MIRANDA     TECHNIQUE: Localizer sequences obtained. Coronal and axial T2 TruFISP  and T2 HASTE sequences obtained through the knees. Axial  2D TOF  sequences obtained in neutral, dorsiflexion, and plantar flexion.  Coronal and axial T2 TruFISP sequences obtained through the knees and  calves. Coronal TWIST sequences obtained through the knees and upper  calves. Coronal subtracted MIP produced. Coronal and axial TruFISP  sequences through the abdomen, pelvis,  thighs, and calves were  obtained. Pre and post fl3d coronal sequences through the abdomen,  pelvis, thighs, and calves were obtained. Multiplanar and 3D  reconstructions were produced.      CONTRAST: 15 mL Multihance IV.     FINDINGS: MRA: No popliteal arterial stenosis demonstrated with  maneuvers in 2DTOF sequences.     Patent aorta. Anterior portions of the celiac and superior mesenteric  arteries are poorly demonstrated. Renal and inferior mesenteric  arteries patent.     Bilateral common, internal, and external iliac arteries patent.     RIGHT: Common, profundus, and superficial femoral arteries patent.     Popliteal artery patent.     High anterior tibial artery origin at the level of the femoral  condyles. Patent three vessel run off to the ankle. Pedal arteries not  included in the study.     LEFT: Common, profundus, and superficial femoral arteries patent.     Popliteal artery patent.     High anterior tibial artery origin at the level of the femoral  condyles. Patent three vessel run off to the ankle. Pedal arteries not  included in the study.     MR: Left upper pole 28 mm renal cyst. Left mid kidney 8 mm cyst.                                                                      IMPRESSION: No popliteal entrapment demonstrated. High origins of  bilateral anterior tibial arteries.     MORENA BULLOCK MD        ASSESSMENT & PLAN  Ms. Jackson is a 63 year old year old female who presents to clinic today with chronic exercise induced bilateral lower leg pain, paresthesias and weakness of ankle and foot.    Extensive workup to date.  Vascular rule-out completed. No concerning findings to suggest lumbar origin, although no MRI lumbar completed.  MR lower legs unremarkable.    Diagnosis: Chronic lower leg pain    Concern at this time does exist for chronic exertional compartment syndrome, especially describing symptoms that began at a specific point in exercise, with associated pain, paresthesias and foot drop.  I  am confident about her vascular work-up and fortunately she has already had MRI/MRI of lower legs.    We discussed diagnostic measures for chronic exertional compartment syndrome to include compartment testing bilaterally.  I have walked her through this mildly invasive procedure in detail.  She would like to pursue this, even if she is unsure about fasciotomy.  She would like to at least confirm suspicion this has been present for 40 years.    I will discuss with my team, physical therapy, and Dr. Polanco to organize a time and date for procedure.  She understands this and will reach out after this to provide option for dates.    It was a pleasure seeing Sherrie today.    Rickie Hull DO, [facility]  Primary Care Sports Medicine        Again, thank you for allowing me to participate in the care of your patient.      Sincerely,    Rickie Hull DO

## (undated) RX ORDER — LIDOCAINE HYDROCHLORIDE 10 MG/ML
INJECTION, SOLUTION INFILTRATION; PERINEURAL
Status: DISPENSED
Start: 2023-09-26